# Patient Record
Sex: FEMALE
[De-identification: names, ages, dates, MRNs, and addresses within clinical notes are randomized per-mention and may not be internally consistent; named-entity substitution may affect disease eponyms.]

---

## 2018-05-02 PROBLEM — Z00.00 ENCOUNTER FOR PREVENTIVE HEALTH EXAMINATION: Status: ACTIVE | Noted: 2018-05-02

## 2018-05-07 ENCOUNTER — APPOINTMENT (OUTPATIENT)
Dept: ORTHOPEDIC SURGERY | Facility: CLINIC | Age: 43
End: 2018-05-07

## 2023-04-11 ENCOUNTER — APPOINTMENT (OUTPATIENT)
Dept: AFTER HOURS CARE | Facility: EMERGENCY ROOM | Age: 48
End: 2023-04-11
Payer: MEDICARE

## 2023-04-11 DIAGNOSIS — M54.50 LOW BACK PAIN, UNSPECIFIED: ICD-10-CM

## 2023-04-11 DIAGNOSIS — Z86.69 PERSONAL HISTORY OF OTHER DISEASES OF THE NERVOUS SYSTEM AND SENSE ORGANS: ICD-10-CM

## 2023-04-11 DIAGNOSIS — B20 HUMAN IMMUNODEFICIENCY VIRUS [HIV] DISEASE: ICD-10-CM

## 2023-04-11 PROCEDURE — 99204 OFFICE O/P NEW MOD 45 MIN: CPT | Mod: 95

## 2023-04-13 PROBLEM — M54.50 ACUTE RIGHT-SIDED LOW BACK PAIN WITHOUT SCIATICA: Status: ACTIVE | Noted: 2023-04-13

## 2023-04-13 PROBLEM — B20 CURRENTLY ASYMPTOMATIC HIV INFECTION, WITH HISTORY OF HIV-RELATED ILLNESS: Status: ACTIVE | Noted: 2023-04-13

## 2023-04-13 PROBLEM — Z86.69 HISTORY OF NEUROPATHY: Status: RESOLVED | Noted: 2023-04-13 | Resolved: 2023-04-13

## 2023-04-13 NOTE — HISTORY OF PRESENT ILLNESS
[Home] : at home, [unfilled] , at the time of the visit. [Other Location: e.g. Home (Enter Location, City,State)___] : at [unfilled] [Verbal consent obtained from patient] : the patient, [unfilled] [FreeTextEntry8] : Middle aged f HIV + (viral load undetectable, cd4 > 1000) presents with right flank pain radiating to right hip area x 1 week. she denies dysuria, fever, chills.  Pain is exacerbated by movement.  She denies any numbness or tingling and states she went to urgent care for the pain last week who sent her to the ED.  At [facility] she had a CT abdomen/pelvis, blood work and a urinalysis which she showed me and were all unremarkable.  They discharged her with Flexeril but no antiinflammatory or pain medication.  She denies bowel or bladder incontinence but feels constipated as well.

## 2023-04-13 NOTE — PHYSICAL EXAM
[No Acute Distress] : no acute distress [Well Nourished] : well nourished [Well Developed] : well developed [Normal Sclera/Conjunctiva] : normal sclera/conjunctiva [PERRL] : pupils equal round and reactive to light [Normal Outer Ear/Nose] : the outer ears and nose were normal in appearance [Normal Oropharynx] : the oropharynx was normal [No JVD] : no jugular venous distention [Supple] : supple [No Lymphadenopathy] : no lymphadenopathy [No Respiratory Distress] : no respiratory distress  [Clear to Auscultation] : lungs were clear to auscultation bilaterally [No Accessory Muscle Use] : no accessory muscle use [Normal Rate] : normal rate  [Regular Rhythm] : with a regular rhythm [No Carotid Bruits] : no carotid bruits [Soft] : abdomen soft [Non Tender] : non-tender [Non-distended] : non-distended [No Joint Swelling] : no joint swelling [Grossly Normal Strength/Tone] : grossly normal strength/tone [No Rash] : no rash [Normal Gait] : normal gait [Coordination Grossly Intact] : coordination grossly intact [No Focal Deficits] : no focal deficits [Normal Insight/Judgement] : insight and judgment were intact [de-identified] : middle aged f appears younger than stated age [de-identified] : right lower flank ttp, no rash, no bruising

## 2023-04-13 NOTE — REVIEW OF SYSTEMS
[Shortness Of Breath] : no shortness of breath [Wheezing] : no wheezing [Nausea] : no nausea [Vomiting] : no vomiting [Dysuria] : no dysuria [Incontinence] : no incontinence [Muscle Pain] : no muscle pain [Back Pain] : back pain [Fainting] : no fainting [Negative] : Heme/Lymph [FreeTextEntry9] : back pain

## 2023-04-13 NOTE — ASSESSMENT
[FreeTextEntry1] : Middle aged f with hiv, neuropathy presents with right sided musculoskeletal back pain.  Patient has lidocaine cream at home, patient advised she can use this will sent ibuprofen and additional Flexeril.  Patient will follow up with her primary doctor, return precautions explained.